# Patient Record
Sex: MALE | Race: WHITE | NOT HISPANIC OR LATINO | Employment: FULL TIME | URBAN - NONMETROPOLITAN AREA
[De-identification: names, ages, dates, MRNs, and addresses within clinical notes are randomized per-mention and may not be internally consistent; named-entity substitution may affect disease eponyms.]

---

## 2022-03-29 ENCOUNTER — OFFICE VISIT (OUTPATIENT)
Dept: URGENT CARE | Facility: CLINIC | Age: 27
End: 2022-03-29
Payer: COMMERCIAL

## 2022-03-29 VITALS
RESPIRATION RATE: 20 BRPM | HEART RATE: 90 BPM | TEMPERATURE: 98.7 F | DIASTOLIC BLOOD PRESSURE: 70 MMHG | OXYGEN SATURATION: 96 % | SYSTOLIC BLOOD PRESSURE: 133 MMHG

## 2022-03-29 DIAGNOSIS — J11.1 FLU: Primary | ICD-10-CM

## 2022-03-29 PROCEDURE — 99203 OFFICE O/P NEW LOW 30 MIN: CPT

## 2022-03-29 RX ORDER — FLUTICASONE PROPIONATE 50 MCG
1 SPRAY, SUSPENSION (ML) NASAL 2 TIMES DAILY
Qty: 9.9 ML | Refills: 0 | Status: SHIPPED | OUTPATIENT
Start: 2022-03-29 | End: 2022-08-05

## 2022-03-29 NOTE — PROGRESS NOTES
[facility]        NAME: Js Morris is a 27 y o  male  : 1995    MRN: [id number]  DATE: 2022  TIME: 2:05 PM    Assessment and Plan   Flu [J11 1]  1  Flu  fluticasone (FLONASE) 50 mcg/act nasal spray     Symptoms most consistent with flu  Will treat as such  Offered to test patient for covid/flu, pt declines at this time  Patient Instructions   Use flonase twice a day one spray in each nostril  Continue taking cold/flu medication  May take tylenol and ibuprofen  Caution using tylenol with anything that contains acetaminophen  Practice good hygiene  Influenza Home Care Guidelines    Your healthcare provider and/or public health staff have evaluated you and have determined that you do not need to remain in the hospital at this time  At this time you can be isolated at home where you will be monitored by staff from your local or state health department  You should carefully follow the prevention and isolation steps below until a healthcare provider or local or state health department says that you can return to your normal activities  Stay home except to get medical care    People who are mildly ill with influenza are able to isolate at home during their illness  You should restrict activities outside your home, except for getting medical care  Do not go to work, school, or public areas  Avoid using public transportation, ride-sharing, or taxis  Separate yourself from other people in your home    People: As much as possible, you should stay in a specific room and away from other people in your home  Also, you should use a separate bathroom, if available  Call ahead before visiting your doctor    If you have a medical appointment, call the healthcare provider and tell them that you have or may have influenza  This will help the healthcare providers office take steps to keep other people from getting infected or exposed      Wear a facemask    You should wear a facemask when you are around other people (e g , sharing a room or vehicle) or pets and before you enter a healthcare providers office  If you are not able to wear a facemask (for example, because it causes trouble breathing), then people who live with you should not stay in the same room with you, or they should wear a facemask if they enter your room  Cover your coughs and sneezes    Cover your mouth and nose with a tissue when you cough or sneeze  Throw used tissues in a lined trash can  Immediately wash your hands with soap and water for at least 20 seconds or, if soap and water are not available, clean your hands with an alcohol-based hand  that contains at least 60% alcohol  Clean your hands often    Wash your hands often with soap and water for at least 20 seconds, especially after blowing your nose, coughing, or sneezing; going to the bathroom; and before eating or preparing food  If soap and water are not readily available, use an alcohol-based hand  with at least 60% alcohol, covering all surfaces of your hands and rubbing them together until they feel dry  Soap and water are the best option if hands are visibly dirty  Avoid touching your eyes, nose, and mouth with unwashed hands  Avoid sharing personal household items    You should not share dishes, drinking glasses, cups, eating utensils, towels, or bedding with other people or pets in your home  After using these items, they should be washed thoroughly with soap and water  Clean all high-touch surfaces everyday    High touch surfaces include counters, tabletops, doorknobs, bathroom fixtures, toilets, phones, keyboards, tablets, and bedside tables  Also, clean any surfaces that may have blood, stool, or body fluids on them  Use a household cleaning spray or wipe, according to the label instructions   Labels contain instructions for safe and effective use of the cleaning product including precautions you should take when applying the product, such as wearing gloves and making sure you have good ventilation during use of the product  Monitor your symptoms    Seek prompt medical attention if your illness is worsening (e g , difficulty breathing)  Before seeking care, call your healthcare provider and tell them that you have, or are being evaluated for, influenza  Put on a facemask before you enter the facility  These steps will help the healthcare providers office to keep other people in the office or waiting room from getting infected or exposed  Ask your healthcare provider to call the local or [facility] health department  Persons who are placed under active monitoring or facilitated self-monitoring should follow instructions provided by their local health department or occupational health professionals, as appropriate  If you have a medical emergency and need to call 911, notify the dispatch personnel that you have, or are being evaluated for influenza  If possible, put on a facemask before emergency medical services arrive  Discontinuing home isolation    Patients with confirmed influenza should remain under home isolation precautions until the following conditions are met:   - They have had no fever for at least 24 hours (that is one full day of no fever without the use medicine that reduces fevers i e: acetaminophen (Tylenol) and ibuprofen (motrin) )  AND  - other symptoms have improved (for example, when their cough or shortness of breath have improved)        Follow up with PCP in 3-5 days  Proceed to  ER if symptoms worsen  Chief Complaint     Chief Complaint   Patient presents with    Cold Like Symptoms     fever, chills, cough, headache, body aches and congestion  Tylenol and cough meds and Thera Flu         History of Present Illness       Pt states that yesterday morning he started with fever, chills, cough, body aches, rhinorrhea, congestion, sneezing  Tmax 103  Is taking theraflu and tylenol with relief   Is pushing fluids including pedialyte and gatorade  Feels fatigued  States girlfriend was recently sick at home with similar symptoms  Son was also sick with similar symptoms Had to call off work today  Denies n/v/d  Is not vaccinated for flu  Took at home covid test which was negative  Review of Systems   Review of Systems   Constitutional: Positive for activity change, appetite change, chills, fatigue and fever  HENT: Positive for congestion, postnasal drip, rhinorrhea and sneezing  Negative for ear pain, facial swelling, nosebleeds, sinus pressure, sinus pain, sore throat and trouble swallowing  Respiratory: Positive for cough  Negative for shortness of breath and wheezing  Cardiovascular: Negative for chest pain and palpitations  Gastrointestinal: Negative for diarrhea, nausea and vomiting  Musculoskeletal: Positive for myalgias  Neurological: Negative for headaches  All other systems reviewed and are negative  Current Medications       Current Outpatient Medications:     fluticasone (FLONASE) 50 mcg/act nasal spray, 1 spray into each nostril 2 (two) times a day for 7 days, Disp: 9 9 mL, Rfl: 0    Current Allergies     Allergies as of 03/29/2022    (No Known Allergies)            The following portions of the patient's history were reviewed and updated as appropriate: allergies, current medications, past family history, past medical history, past social history, past surgical history and problem list      History reviewed  No pertinent past medical history  History reviewed  No pertinent surgical history  History reviewed  No pertinent family history  Medications have been verified  Objective   /70   Pulse 90   Temp 98 7 °F (37 1 °C)   Resp 20   SpO2 96%        Physical Exam     Physical Exam  Vitals reviewed  Constitutional:       General: He is not in acute distress  Appearance: Normal appearance  He is normal weight   He is not ill-appearing or toxic-appearing  HENT:      Right Ear: A middle ear effusion is present  Tympanic membrane is not injected, erythematous or bulging  Left Ear: A middle ear effusion is present  Tympanic membrane is not injected, erythematous or bulging  Nose: Congestion present  Right Turbinates: Enlarged  Not swollen or pale  Left Turbinates: Enlarged  Not swollen or pale  Right Sinus: No maxillary sinus tenderness or frontal sinus tenderness  Left Sinus: No maxillary sinus tenderness or frontal sinus tenderness  Mouth/Throat:      Pharynx: Uvula midline  Posterior oropharyngeal erythema present  No pharyngeal swelling, oropharyngeal exudate or uvula swelling  Comments: Post nasal drip noted  Cardiovascular:      Rate and Rhythm: Normal rate and regular rhythm  Pulses: Normal pulses  Heart sounds: Normal heart sounds  No murmur heard  No friction rub  No gallop  Pulmonary:      Effort: Pulmonary effort is normal  No respiratory distress  Breath sounds: Normal breath sounds  No stridor  No wheezing, rhonchi or rales  Chest:      Chest wall: No tenderness  Lymphadenopathy:      Cervical: No cervical adenopathy  Skin:     General: Skin is warm  Capillary Refill: Capillary refill takes less than 2 seconds  Neurological:      General: No focal deficit present  Mental Status: He is alert

## 2022-03-29 NOTE — PATIENT INSTRUCTIONS
Use flonase twice a day one spray in each nostril  Continue taking cold/flu medication  May take tylenol and ibuprofen  Caution using tylenol with anything that contains acetaminophen  Practice good hygiene  Influenza Home Care Guidelines    Your healthcare provider and/or public health staff have evaluated you and have determined that you do not need to remain in the hospital at this time  At this time you can be isolated at home where you will be monitored by staff from your local or state health department  You should carefully follow the prevention and isolation steps below until a healthcare provider or local or state health department says that you can return to your normal activities  Stay home except to get medical care    People who are mildly ill with influenza are able to isolate at home during their illness  You should restrict activities outside your home, except for getting medical care  Do not go to work, school, or public areas  Avoid using public transportation, ride-sharing, or taxis  Separate yourself from other people in your home    People: As much as possible, you should stay in a specific room and away from other people in your home  Also, you should use a separate bathroom, if available  Call ahead before visiting your doctor    If you have a medical appointment, call the healthcare provider and tell them that you have or may have influenza  This will help the healthcare providers office take steps to keep other people from getting infected or exposed  Wear a facemask    You should wear a facemask when you are around other people (e g , sharing a room or vehicle) or pets and before you enter a healthcare providers office  If you are not able to wear a facemask (for example, because it causes trouble breathing), then people who live with you should not stay in the same room with you, or they should wear a facemask if they enter your room      Cover your coughs and sneezes    Cover your mouth and nose with a tissue when you cough or sneeze  Throw used tissues in a lined trash can  Immediately wash your hands with soap and water for at least 20 seconds or, if soap and water are not available, clean your hands with an alcohol-based hand  that contains at least 60% alcohol  Clean your hands often    Wash your hands often with soap and water for at least 20 seconds, especially after blowing your nose, coughing, or sneezing; going to the bathroom; and before eating or preparing food  If soap and water are not readily available, use an alcohol-based hand  with at least 60% alcohol, covering all surfaces of your hands and rubbing them together until they feel dry  Soap and water are the best option if hands are visibly dirty  Avoid touching your eyes, nose, and mouth with unwashed hands  Avoid sharing personal household items    You should not share dishes, drinking glasses, cups, eating utensils, towels, or bedding with other people or pets in your home  After using these items, they should be washed thoroughly with soap and water  Clean all high-touch surfaces everyday    High touch surfaces include counters, tabletops, doorknobs, bathroom fixtures, toilets, phones, keyboards, tablets, and bedside tables  Also, clean any surfaces that may have blood, stool, or body fluids on them  Use a household cleaning spray or wipe, according to the label instructions  Labels contain instructions for safe and effective use of the cleaning product including precautions you should take when applying the product, such as wearing gloves and making sure you have good ventilation during use of the product  Monitor your symptoms    Seek prompt medical attention if your illness is worsening (e g , difficulty breathing)  Before seeking care, call your healthcare provider and tell them that you have, or are being evaluated for, influenza   Put on a facemask before you enter the facility  These steps will help the healthcare providers office to keep other people in the office or waiting room from getting infected or exposed  Ask your healthcare provider to call the local or state health department  Persons who are placed under active monitoring or facilitated self-monitoring should follow instructions provided by their local health department or occupational health professionals, as appropriate  If you have a medical emergency and need to call 911, notify the dispatch personnel that you have, or are being evaluated for influenza  If possible, put on a facemask before emergency medical services arrive      Discontinuing home isolation    Patients with confirmed influenza should remain under home isolation precautions until the following conditions are met:   - They have had no fever for at least 24 hours (that is one full day of no fever without the use medicine that reduces fevers i e: acetaminophen (Tylenol) and ibuprofen (motrin) )  AND  - other symptoms have improved (for example, when their cough or shortness of breath have improved)

## 2022-03-29 NOTE — LETTER
[facility] [address] [address]  Dept: [phone]    March 29, 2022    Patient: Venkat Warren  YOB: 1995    Venkat Warren was seen and evaluated at our [facility]  Flu is positive, they may return to work on 4/4/2022, as this is 5 days from the onset of symptoms  Upon return, they must then adhere to strict masking for an additional 5 days      Sincerely,    [employer] [facility]

## 2022-08-05 ENCOUNTER — OFFICE VISIT (OUTPATIENT)
Dept: URGENT CARE | Facility: CLINIC | Age: 27
End: 2022-08-05
Payer: COMMERCIAL

## 2022-08-05 VITALS
SYSTOLIC BLOOD PRESSURE: 130 MMHG | HEART RATE: 82 BPM | RESPIRATION RATE: 20 BRPM | OXYGEN SATURATION: 96 % | TEMPERATURE: 98.1 F | DIASTOLIC BLOOD PRESSURE: 68 MMHG

## 2022-08-05 DIAGNOSIS — U07.1 COVID-19: Primary | ICD-10-CM

## 2022-08-05 PROCEDURE — 99213 OFFICE O/P EST LOW 20 MIN: CPT

## 2022-08-05 NOTE — LETTER
[facility] [address] [address]  Dept: [phone]    August 5, 2022    Patient: Maciel Lowe  YOB: 1995    Maciel Lowe was seen and evaluated at our [facility]  Please note if Covid and Flu tests are negative, they may return to work when fever free for 24 hours without the use of a fever reducing agent  If Covid or Flu test is positive, they may return to work on 8/8/2022, as this is 5 days from the onset of symptoms  Upon return, they must then adhere to strict masking for an additional 5 days      Sincerely,    [employer] [facility]

## 2022-08-05 NOTE — PROGRESS NOTES
[employer]        NAME: Rafat Garnica is a 27 y o  male  : 1995    MRN: [id number]  DATE: 2022  TIME: 12:48 PM    Assessment and Plan   COVID-19 [U07 1]  1  COVID-19  Ambulatory referral to [facility]     covid-19  Needed work note  Needs PCP, referral placed    Patient Instructions     You have tested positive for COVID-19  Current recommendations include 2000 International Units of vitamin D3 by mouth daily, 1 g of vitamin-C twice a day, and a multivitamin  Quarantine guidelines state that you must quarantine for 5 days from the onset of symptoms with positive test   On day 6 you may return to work as long as you wear a mask for the next 5 days and take breaks alone with a closed door  This quarantine guideline is only if your symptoms are getting better and you have no fever for 24 hours on day 5 without the use of a fever reducing agent such as Tylenol or Motrin  If your symptoms are not getting better or your fever is persistent beyond those days it is important to continue quarantine  Individuals should be fever-free for 24 hours without medication and improving before ending isolation   Highly immunosuppressed patients should remain on isolation for ? 10 days due to risk of prolonged viral shedding     Contact your family doctor in regards to your positive result for possible treatment options  If your symptoms worsen including shortness of breath proceed to emergency department, please call the emergency department prior to arrival and inform them that you are COVID positive  [address]    Your healthcare provider and/or public health staff have evaluated you and have determined that you do not need to remain in the hospital at this time  At this time you can be isolated at home where you will be monitored by staff from your local or state health department   You should carefully follow the prevention and isolation steps below until a healthcare provider or local or formerly [facility] health department says that you can return to your normal activities  Stay home except to get medical care    People who are mildly ill with COVID-19 are able to isolate at home during their illness  You should restrict activities outside your home, except for getting medical care  Do not go to work, school, or public areas  Avoid using public transportation, ride-sharing, or taxis  Separate yourself from other people and animals in your home    People: As much as possible, you should stay in a specific room and away from other people in your home  Also, you should use a separate bathroom, if available  Animals: You should restrict contact with pets and other animals while you are sick with COVID-19, just like you would around other people  Although there have not been reports of pets or other animals becoming sick with COVID-19, it is still recommended that people sick with COVID-19 limit contact with animals until more information is known about the virus  When possible, have another member of your household care for your animals while you are sick  If you are sick with COVID-19, avoid contact with your pet, including petting, snuggling, being kissed or licked, and sharing food  If you must care for your pet or be around animals while you are sick, wash your hands before and after you interact with pets and wear a facemask  See COVID-19 and Animals for more information  Call ahead before visiting your doctor    If you have a medical appointment, call the healthcare provider and tell them that you have or may have COVID-19  This will help the healthcare providers office take steps to keep other people from getting infected or exposed  Wear a facemask    You should wear a facemask when you are around other people (e g , sharing a room or vehicle) or pets and before you enter a healthcare providers office   If you are not able to wear a facemask (for example, because it causes trouble breathing), then people who live with you should not stay in the same room with you, or they should wear a facemask if they enter your room  Cover your coughs and sneezes    Cover your mouth and nose with a tissue when you cough or sneeze  Throw used tissues in a lined trash can  Immediately wash your hands with soap and water for at least 20 seconds or, if soap and water are not available, clean your hands with an alcohol-based hand  that contains at least 60% alcohol  Clean your hands often    Wash your hands often with soap and water for at least 20 seconds, especially after blowing your nose, coughing, or sneezing; going to the bathroom; and before eating or preparing food  If soap and water are not readily available, use an alcohol-based hand  with at least 60% alcohol, covering all surfaces of your hands and rubbing them together until they feel dry  Soap and water are the best option if hands are visibly dirty  Avoid touching your eyes, nose, and mouth with unwashed hands  Avoid sharing personal household items    You should not share dishes, drinking glasses, cups, eating utensils, towels, or bedding with other people or pets in your home  After using these items, they should be washed thoroughly with soap and water  Clean all high-touch surfaces everyday    High touch surfaces include counters, tabletops, doorknobs, bathroom fixtures, toilets, phones, keyboards, tablets, and bedside tables  Also, clean any surfaces that may have blood, stool, or body fluids on them  Use a household cleaning spray or wipe, according to the label instructions  Labels contain instructions for safe and effective use of the cleaning product including precautions you should take when applying the product, such as wearing gloves and making sure you have good ventilation during use of the product      Monitor your symptoms    Seek prompt medical attention if your illness is worsening (e g , difficulty breathing)  Before seeking care, call your healthcare provider and tell them that you have, or are being evaluated for, COVID-19  Put on a facemask before you enter the facility  These steps will help the healthcare providers office to keep other people in the office or waiting room from getting infected or exposed  Ask your healthcare provider to call the local or [facility] health department  Persons who are placed under active monitoring or facilitated self-monitoring should follow instructions provided by their local health department or occupational health professionals, as appropriate  If you have a medical emergency and need to call 911, notify the dispatch personnel that you have, or are being evaluated for COVID-19  If possible, put on a facemask before emergency medical services arrive  Discontinuing home isolation    Patients with confirmed COVID-19 should remain under home isolation precautions until the following conditions are met:   - They have had no fever for at least 24 hours (that is one full day of no fever without the use medicine that reduces fevers)  AND  - other symptoms have improved (for example, when their cough or shortness of breath have improved)  AND  - If had mild or moderate illness, at least 10 days have passed since their symptoms first appeared or if severe illness (needed oxygen) or immunosuppressed, at least 20 days have passed since symptoms first appeared  Patients with confirmed COVID-19 should also notify close contacts (including their workplace) and ask that they self-quarantine  Currently, close contact is defined as being within 6 feet for 15 minutes or more from the period 24 hours starting 48 hours before symptom onset to the time at which the patient went into isolation    Close contacts of patients diagnosed with COVID-19 should be instructed by the patient to self-quarantine for 14 days from the last time of their last contact with the patient  [facility]   Follow up with PCP in 3-5 days  Proceed to  ER if symptoms worsen  Chief Complaint     Chief Complaint   Patient presents with   Rachel Shelleyl +     + since Tuesday  Fevers, cough, congestion, chills  Using Tylenol and Motrin         History of Present Illness       Patient is a 27year old male who presents to the office for + COVID  States he has had on and off fevers  Has been using tylenol and motrin at home  Needs a note for work  Denies needing other medication  Is not vaccinated  Has not had COVID in past  Girlfriend and son have COVID at home  Has fever, chills, cough, rhinorrhea, myalgia, sore throat, sneezing, fatigue  Denies chest pain or sob  Review of Systems   Review of Systems   Constitutional: Positive for chills, fatigue and fever  HENT: Positive for congestion, postnasal drip, sinus pressure, sinus pain, sneezing and sore throat  Negative for trouble swallowing  Respiratory: Positive for cough  Negative for shortness of breath and wheezing  Cardiovascular: Negative for chest pain and palpitations  Gastrointestinal: Negative for diarrhea, nausea and vomiting  Musculoskeletal: Positive for myalgias  Neurological: Negative for headaches  All other systems reviewed and are negative  Current Medications     No current outpatient medications on file  Current Allergies     Allergies as of 08/05/2022    (No Known Allergies)            The following portions of the patient's history were reviewed and updated as appropriate: allergies, current medications, past family history, past medical history, past social history, past surgical history and problem list      History reviewed  No pertinent past medical history  History reviewed  No pertinent surgical history  History reviewed  No pertinent family history  Medications have been verified          Objective   /68 Pulse 82   Temp 98 1 °F (36 7 °C)   Resp 20   SpO2 96%        Physical Exam     Physical Exam  Vitals and nursing note reviewed  Constitutional:       General: He is not in acute distress  Appearance: Normal appearance  He is normal weight  He is not ill-appearing or toxic-appearing  HENT:      Right Ear: There is impacted cerumen  Left Ear: There is impacted cerumen  Nose: Congestion and rhinorrhea present  Rhinorrhea is clear  Right Turbinates: Enlarged and swollen  Not pale  Left Turbinates: Enlarged and swollen  Not pale  Right Sinus: No maxillary sinus tenderness or frontal sinus tenderness  Left Sinus: No maxillary sinus tenderness or frontal sinus tenderness  Mouth/Throat:      Pharynx: Uvula midline  Posterior oropharyngeal erythema present  No pharyngeal swelling, oropharyngeal exudate or uvula swelling  Tonsils: No tonsillar exudate or tonsillar abscesses  Comments: Posterior pharynx erythema  Cardiovascular:      Rate and Rhythm: Normal rate and regular rhythm  Pulses: Normal pulses  Heart sounds: Normal heart sounds  No murmur heard  No friction rub  No gallop  Pulmonary:      Effort: Pulmonary effort is normal  No respiratory distress  Breath sounds: Normal breath sounds  No stridor  No wheezing, rhonchi or rales  Chest:      Chest wall: No tenderness  Musculoskeletal:      Cervical back: No tenderness  Lymphadenopathy:      Cervical: No cervical adenopathy  Neurological:      Mental Status: He is alert

## 2022-08-05 NOTE — PATIENT INSTRUCTIONS
You have tested positive for COVID-19  Current recommendations include 2000 International Units of vitamin D3 by mouth daily, 1 g of vitamin-C twice a day, and a multivitamin  Quarantine guidelines state that you must quarantine for 5 days from the onset of symptoms with positive test   On day 6 you may return to work as long as you wear a mask for the next 5 days and take breaks alone with a closed door  This quarantine guideline is only if your symptoms are getting better and you have no fever for 24 hours on day 5 without the use of a fever reducing agent such as Tylenol or Motrin  If your symptoms are not getting better or your fever is persistent beyond those days it is important to continue quarantine  Individuals should be fever-free for 24 hours without medication and improving before ending isolation   Highly immunosuppressed patients should remain on isolation for ? 10 days due to risk of prolonged viral shedding     Contact your family doctor in regards to your positive result for possible treatment options  If your symptoms worsen including shortness of breath proceed to emergency department, please call the emergency department prior to arrival and inform them that you are COVID positive  [address]    Your healthcare provider and/or public health staff have evaluated you and have determined that you do not need to remain in the hospital at this time  At this time you can be isolated at home where you will be monitored by staff from your local or state health department  You should carefully follow the prevention and isolation steps below until a healthcare provider or local or state health department says that you can return to your normal activities  Stay home except to get medical care    People who are mildly ill with COVID-19 are able to isolate at home during their illness  You should restrict activities outside your home, except for getting medical care  Do not go to work, school, or public areas  Avoid using public transportation, ride-sharing, or taxis  Separate yourself from other people and animals in your home    People: As much as possible, you should stay in a specific room and away from other people in your home  Also, you should use a separate bathroom, if available  Animals: You should restrict contact with pets and other animals while you are sick with COVID-19, just like you would around other people  Although there have not been reports of pets or other animals becoming sick with COVID-19, it is still recommended that people sick with COVID-19 limit contact with animals until more information is known about the virus  When possible, have another member of your household care for your animals while you are sick  If you are sick with COVID-19, avoid contact with your pet, including petting, snuggling, being kissed or licked, and sharing food  If you must care for your pet or be around animals while you are sick, wash your hands before and after you interact with pets and wear a facemask  See COVID-19 and Animals for more information  Call ahead before visiting your doctor    If you have a medical appointment, call the healthcare provider and tell them that you have or may have COVID-19  This will help the healthcare providers office take steps to keep other people from getting infected or exposed  Wear a facemask    You should wear a facemask when you are around other people (e g , sharing a room or vehicle) or pets and before you enter a healthcare providers office  If you are not able to wear a facemask (for example, because it causes trouble breathing), then people who live with you should not stay in the same room with you, or they should wear a facemask if they enter your room  Cover your coughs and sneezes    Cover your mouth and nose with a tissue when you cough or sneeze  Throw used tissues in a lined trash can   Immediately wash your hands with soap and water for at least 20 seconds or, if soap and water are not available, clean your hands with an alcohol-based hand  that contains at least 60% alcohol  Clean your hands often    Wash your hands often with soap and water for at least 20 seconds, especially after blowing your nose, coughing, or sneezing; going to the bathroom; and before eating or preparing food  If soap and water are not readily available, use an alcohol-based hand  with at least 60% alcohol, covering all surfaces of your hands and rubbing them together until they feel dry  Soap and water are the best option if hands are visibly dirty  Avoid touching your eyes, nose, and mouth with unwashed hands  Avoid sharing personal household items    You should not share dishes, drinking glasses, cups, eating utensils, towels, or bedding with other people or pets in your home  After using these items, they should be washed thoroughly with soap and water  Clean all high-touch surfaces everyday    High touch surfaces include counters, tabletops, doorknobs, bathroom fixtures, toilets, phones, keyboards, tablets, and bedside tables  Also, clean any surfaces that may have blood, stool, or body fluids on them  Use a household cleaning spray or wipe, according to the label instructions  Labels contain instructions for safe and effective use of the cleaning product including precautions you should take when applying the product, such as wearing gloves and making sure you have good ventilation during use of the product  Monitor your symptoms    Seek prompt medical attention if your illness is worsening (e g , difficulty breathing)  Before seeking care, call your healthcare provider and tell them that you have, or are being evaluated for, COVID-19  Put on a facemask before you enter the facility   These steps will help the healthcare providers office to keep other people in the office or waiting room from getting infected or exposed  Ask your healthcare provider to call the local or state health department  Persons who are placed under active monitoring or facilitated self-monitoring should follow instructions provided by their local health department or occupational health professionals, as appropriate  If you have a medical emergency and need to call 911, notify the dispatch personnel that you have, or are being evaluated for COVID-19  If possible, put on a facemask before emergency medical services arrive  Discontinuing home isolation    Patients with confirmed COVID-19 should remain under home isolation precautions until the following conditions are met: They have had no fever for at least 24 hours (that is one full day of no fever without the use medicine that reduces fevers)  AND  other symptoms have improved (for example, when their cough or shortness of breath have improved)  AND  If had mild or moderate illness, at least 10 days have passed since their symptoms first appeared or if severe illness (needed oxygen) or immunosuppressed, at least 20 days have passed since symptoms first appeared  Patients with confirmed COVID-19 should also notify close contacts (including their workplace) and ask that they self-quarantine  Currently, close contact is defined as being within 6 feet for 15 minutes or more from the period 24 hours starting 48 hours before symptom onset to the time at which the patient went into isolation  Close contacts of patients diagnosed with COVID-19 should be instructed by the patient to self-quarantine for 14 days from the last time of their last contact with the patient       Source: [employer]

## 2022-12-19 ENCOUNTER — HOSPITAL ENCOUNTER (EMERGENCY)
Facility: HOSPITAL | Age: 27
Discharge: HOME | End: 2022-12-19
Attending: EMERGENCY MEDICINE

## 2022-12-19 VITALS
DIASTOLIC BLOOD PRESSURE: 64 MMHG | TEMPERATURE: 97.5 F | BODY MASS INDEX: 17.18 KG/M2 | HEIGHT: 69 IN | WEIGHT: 115.96 LBS | OXYGEN SATURATION: 98 % | RESPIRATION RATE: 16 BRPM | HEART RATE: 88 BPM | SYSTOLIC BLOOD PRESSURE: 121 MMHG

## 2022-12-19 DIAGNOSIS — T78.40XA ALLERGIC REACTION, INITIAL ENCOUNTER: Primary | ICD-10-CM

## 2022-12-19 RX ORDER — METHYLPREDNISOLONE SODIUM SUCCINATE 125 MG/2ML
125 INJECTION, POWDER, LYOPHILIZED, FOR SOLUTION INTRAMUSCULAR; INTRAVENOUS ONCE
Status: COMPLETED | OUTPATIENT
Start: 2022-12-19 | End: 2022-12-19

## 2022-12-19 RX ORDER — PREDNISONE 20 MG/1
20 TABLET ORAL DAILY
Qty: 15 TABLET | Refills: 0 | Status: SHIPPED | OUTPATIENT
Start: 2022-12-19

## 2022-12-19 RX ADMIN — METHYLPREDNISOLONE SODIUM SUCCINATE 125 MG: 125 INJECTION, POWDER, FOR SOLUTION INTRAMUSCULAR; INTRAVENOUS at 07:54

## 2022-12-19 NOTE — ED PROVIDER NOTES
History  Chief Complaint   Patient presents with   • Allergic Reaction     Patient presents to the ER for a possible allergic reaction  He woke up at 0300 due to his face feeling swollen, a "lump" in his throat, and hives on his neck  He states it feels "weird" swallowing  No resp distress  Patient had epi and benadryl pre hospital       Patient is a pleasant 59-year-old male presenting after he woke with an allergic reaction  He states he woke up approximate 3:00 in the morning and felt a lump in his throat  He then noted hives on upper back and neck area  He then felt his lips mostly his upper lip felt swollen  So little from a sensation in the back of his throat  Denies any wheezing or difficulty breathing  No difficulty swallowing  No known food exposures or medications, lotions or detergents  No recent travel  No other family members have similar symptoms  States he had tacos which she made at home last evening which she has had before  No different ingredients or condiments  Patient arrived via EMS and received Benadryl IV and epinephrine IM by EMS  None       History reviewed  No pertinent past medical history  History reviewed  No pertinent surgical history  History reviewed  No pertinent family history  I have reviewed and agree with the history as documented  E-Cigarette/Vaping   • E-Cigarette Use Never User      E-Cigarette/Vaping Substances     Social History     Tobacco Use   • Smoking status: Never   • Smokeless tobacco: Never   Vaping Use   • Vaping Use: Never used   Substance Use Topics   • Alcohol use: Never   • Drug use: Never       Review of Systems   Constitutional: Negative for activity change, appetite change, chills and fever  HENT: Negative for ear pain, hearing loss, rhinorrhea, sneezing, sore throat and trouble swallowing  Eyes: Negative for pain and visual disturbance     Respiratory: Negative for cough, choking, chest tightness, shortness of breath, wheezing and stridor  Cardiovascular: Negative for chest pain, palpitations and leg swelling  Gastrointestinal: Negative for abdominal pain, constipation, diarrhea, nausea and vomiting  Genitourinary: Negative for difficulty urinating, dysuria, frequency, hematuria and testicular pain  Musculoskeletal: Negative for arthralgias, back pain, gait problem and neck pain  Skin: Positive for pallor  Negative for color change and rash  Allergic/Immunologic: Negative for immunocompromised state  Neurological: Negative for dizziness, seizures, syncope, speech difficulty, weakness, light-headedness, numbness and headaches  Psychiatric/Behavioral: Negative for confusion  The patient is not nervous/anxious  All other systems reviewed and are negative  Physical Exam  Physical Exam  Vitals and nursing note reviewed  Constitutional:       General: He is not in acute distress  Appearance: Normal appearance  He is well-developed and normal weight  He is not ill-appearing, toxic-appearing or diaphoretic  HENT:      Head: Normocephalic and atraumatic  Comments: Mild swelling of the upper leg     Nose: Nose normal  No congestion  Mouth/Throat:      Mouth: Mucous membranes are moist       Pharynx: Oropharynx is clear  Eyes:      General: No scleral icterus  Extraocular Movements: Extraocular movements intact  Conjunctiva/sclera: Conjunctivae normal    Cardiovascular:      Rate and Rhythm: Normal rate and regular rhythm  Pulses: Normal pulses  Heart sounds: Normal heart sounds  No murmur heard  Pulmonary:      Effort: Pulmonary effort is normal  No respiratory distress  Breath sounds: Normal breath sounds  No wheezing or rales  Chest:      Chest wall: No tenderness  Abdominal:      General: Bowel sounds are normal  There is no distension  Palpations: Abdomen is soft  There is no mass  Tenderness: There is no abdominal tenderness   There is no right CVA tenderness, left CVA tenderness, guarding or rebound  Musculoskeletal:         General: No swelling, tenderness or deformity  Normal range of motion  Cervical back: Normal range of motion and neck supple  Lymphadenopathy:      Cervical: No cervical adenopathy  Skin:     General: Skin is warm and dry  Capillary Refill: Capillary refill takes less than 2 seconds  Coloration: Skin is not jaundiced  Findings: Rash present  No erythema  Comments: Small area of urticaria posterior neck   Neurological:      General: No focal deficit present  Mental Status: He is alert and oriented to person, place, and time  Motor: No abnormal muscle tone  Psychiatric:         Mood and Affect: Mood normal          Behavior: Behavior normal          Vital Signs  ED Triage Vitals [12/19/22 0717]   Temperature Pulse Respirations Blood Pressure SpO2   97 5 °F (36 4 °C) 89 16 (!) 174/94 99 %      Temp Source Heart Rate Source Patient Position - Orthostatic VS BP Location FiO2 (%)   Temporal Monitor Sitting Left arm --      Pain Score       No Pain           Vitals:    12/19/22 0717 12/19/22 0800 12/19/22 0935   BP: (!) 174/94 117/66 121/64   Pulse: 89 87 88   Patient Position - Orthostatic VS: Sitting Lying Lying         Visual Acuity      ED Medications  Medications   methylPREDNISolone sodium succinate (Solu-MEDROL) injection 125 mg (125 mg Intravenous Given 12/19/22 0754)       Diagnostic Studies  Results Reviewed     None                 No orders to display              Procedures  Procedures         ED Course                               SBIRT 22yo+    Flowsheet Row Most Recent Value   SBIRT (23 yo +)    In order to provide better care to our patients, we are screening all of our patients for alcohol and drug use  Would it be okay to ask you these screening questions? Yes Filed at: 12/19/2022 0886   Initial Alcohol Screen: US AUDIT-C     1   How often do you have a drink containing alcohol? 0 Filed at: 12/19/2022 0841   2  How many drinks containing alcohol do you have on a typical day you are drinking? 0 Filed at: 12/19/2022 0841   3a  Male UNDER 65: How often do you have five or more drinks on one occasion? 0 Filed at: 12/19/2022 0841   3b  FEMALE Any Age, or MALE 65+: How often do you have 4 or more drinks on one occassion? 0 Filed at: 12/19/2022 0841   Audit-C Score 0 Filed at: 12/19/2022 7159   CHELSIE: How many times in the past year have you    Used an illegal drug or used a prescription medication for non-medical reasons? Never Filed at: 12/19/2022 0841                    MDM  Number of Diagnoses or Management Options  Allergic reaction, initial encounter: new and does not require workup  Diagnosis management comments: Patient improving  No difficulty swallowing or difficulty breathing  No posterior pharyngeal erythema or edema  Disposition  Final diagnoses: Allergic reaction, initial encounter     Time reflects when diagnosis was documented in both MDM as applicable and the Disposition within this note     Time User Action Codes Description Comment    12/19/2022  9:27 AM Jacobo ESPOSITO Add [T78 40XA] Allergic reaction, initial encounter       ED Disposition     ED Disposition   Discharge    Condition   Stable    Date/Time   Mon Dec 19, 2022  9:27 AM    Yoel Torres discharge to home/self care  Follow-up Information    None         Discharge Medication List as of 12/19/2022  9:28 AM      START taking these medications    Details   predniSONE 20 mg tablet Take 1 tablet (20 mg total) by mouth daily, Starting Mon 12/19/2022, Normal             No discharge procedures on file      PDMP Review     None          ED Provider  Electronically Signed by           Josselyn Plummer DO  12/19/22 1022

## 2022-12-19 NOTE — Clinical Note
[facility] was seen and treated in our emergency department on 12/19/2022  Diagnosis:     Juni Puga  may return to work on return date  He may return on this date: 12/20/2022         If you have any questions or concerns, please don't hesitate to call        Tamela Javed, DO    ______________________________           _______________          _______________  Hospital Representative                              Date                                Time

## 2022-12-19 NOTE — DISCHARGE INSTRUCTIONS
Take medications as directed and until completed  Follow-up with your primary care physician next available appointment  Return to the emergency department if condition worsens

## 2022-12-20 ENCOUNTER — OFFICE VISIT (OUTPATIENT)
Dept: URGENT CARE | Facility: CLINIC | Age: 27
End: 2022-12-20

## 2022-12-20 VITALS
RESPIRATION RATE: 20 BRPM | HEIGHT: 69 IN | HEART RATE: 92 BPM | SYSTOLIC BLOOD PRESSURE: 144 MMHG | OXYGEN SATURATION: 98 % | BODY MASS INDEX: 31.1 KG/M2 | DIASTOLIC BLOOD PRESSURE: 77 MMHG | TEMPERATURE: 98.2 F | WEIGHT: 210 LBS

## 2022-12-20 DIAGNOSIS — L50.9 URTICARIA: Primary | ICD-10-CM

## 2022-12-20 RX ORDER — PREDNISONE 20 MG/1
30 TABLET ORAL DAILY
Qty: 6 TABLET | Refills: 0 | Status: SHIPPED | OUTPATIENT
Start: 2022-12-20 | End: 2022-12-24

## 2022-12-20 RX ORDER — PREDNISONE 20 MG/1
30 TABLET ORAL DAILY
Status: SHIPPED | OUTPATIENT
Start: 2022-12-21

## 2022-12-20 RX ORDER — FAMOTIDINE 20 MG/1
20 TABLET, FILM COATED ORAL ONCE
Status: COMPLETED | OUTPATIENT
Start: 2022-12-20 | End: 2022-12-20

## 2022-12-20 RX ORDER — FAMOTIDINE 10 MG
10 TABLET ORAL 2 TIMES DAILY
Qty: 10 TABLET | Refills: 0 | Status: SHIPPED | OUTPATIENT
Start: 2022-12-20 | End: 2022-12-25

## 2022-12-20 RX ORDER — DIPHENHYDRAMINE HCL 25 MG
25 TABLET ORAL EVERY 6 HOURS PRN
Qty: 30 TABLET | Refills: 0 | Status: SHIPPED | OUTPATIENT
Start: 2022-12-20

## 2022-12-20 RX ORDER — DIPHENHYDRAMINE HCL 25 MG
25 CAPSULE ORAL ONCE
Status: COMPLETED | OUTPATIENT
Start: 2022-12-20 | End: 2022-12-20

## 2022-12-20 RX ADMIN — PREDNISONE 30 MG: 20 TABLET ORAL at 16:25

## 2022-12-20 RX ADMIN — Medication 25 MG: at 16:26

## 2022-12-20 RX ADMIN — FAMOTIDINE 20 MG: 20 TABLET, FILM COATED ORAL at 16:26

## 2022-12-20 NOTE — PROGRESS NOTES
[facility]        NAME: Precious Nelson is a 24 y o  male  : 1995    MRN: [id number]  DATE: 2022  TIME: 5:08 PM    Assessment and Plan   Urticaria [L50 9]  1  Urticaria  predniSONE tablet 30 mg    famotidine (PEPCID) tablet 20 mg    diphenhydrAMINE (BENADRYL) capsule 25 mg    predniSONE 20 mg tablet    diphenhydrAMINE (BENADRYL) 25 mg tablet    famotidine (PEPCID) 10 mg tablet        Patient had 20 mg of prednisone prior to arrival will increase by 30 mg here in the office as well as for at home for the next 4 days  Benadryl administered in the office as well as Pepcid  We will send prescription for same as patient states he does not have them at home informed that if they are not covered he may pick them up over-the-counter  ER precautions advised  Prior to leaving the office the patient states that he did feel as though his hives were diminishing and it he was starting to feel better  Patient Instructions   The hives are likely related to something you are coming into contact with in which you are allergic to  Take the prednisone as prescribed, add the additional prednisone that I prescribed to you for the next 4 days starting on 2022  Please take Pepcid twice daily for the next 5 to 7 days and use Benadryl every 6 hours as needed for hives or itching  If your symptoms worsen proceed to the emergency room  Otherwise please follow-up with your primary care provider and possibly an allergist to see what you may be allergic to  Please wash your sheets and make sure that you are washing any clothing prior to wearing it in a familiar laundry detergent  Do not change any dryer sheets or fabric softeners  Please do not change sense of any sprays as you may be allergic if you can think of anything that you have recently changed in the past 3 to 4 days this may be the cause try limiting this from your contact  Follow up with PCP in 3-5 days    Proceed to  ER if symptoms worsen  Chief Complaint     Chief Complaint   Patient presents with   • Urticaria         History of Present Illness       Patient is a 24year old male who presents to the office today for hives  States he awoke yesterday with hives and sore throat with swelling  Had benadryl, epinephrine, and steroid  Awoke this morning and had issues again  Did not was the sheets  Denies any changes to dryer sheets, fabric softner, night gowns  Did  the predisone and took one today  Came in for hives that started around 0900  Review of Systems   Review of Systems   HENT: Positive for facial swelling and voice change  Negative for drooling, sore throat and trouble swallowing  Respiratory: Positive for cough  Negative for shortness of breath and wheezing  Cardiovascular: Negative for chest pain and palpitations  Skin: Positive for rash  All other systems reviewed and are negative          Current Medications       Current Outpatient Medications:   •  diphenhydrAMINE (BENADRYL) 25 mg tablet, Take 1 tablet (25 mg total) by mouth every 6 (six) hours as needed for itching, Disp: 30 tablet, Rfl: 0  •  famotidine (PEPCID) 10 mg tablet, Take 1 tablet (10 mg total) by mouth 2 (two) times a day for 5 days, Disp: 10 tablet, Rfl: 0  •  predniSONE 20 mg tablet, Take 1 tablet (20 mg total) by mouth daily, Disp: 15 tablet, Rfl: 0  •  predniSONE 20 mg tablet, Take 1 5 tablets (30 mg total) by mouth daily for 4 days, Disp: 6 tablet, Rfl: 0    Current Facility-Administered Medications:   •  [START ON 12/21/2022] predniSONE tablet 30 mg, 30 mg, Oral, Daily, Vivian BOY Mcintosh, 30 mg at 12/20/22 1625    Current Allergies     Allergies as of 12/20/2022   • (No Known Allergies)            The following portions of the patient's history were reviewed and updated as appropriate: allergies, current medications, past family history, past medical history, past social history, past surgical history and problem list  History reviewed  No pertinent past medical history  History reviewed  No pertinent surgical history  History reviewed  No pertinent family history  Medications have been verified  Objective   /77   Pulse 92   Temp 98 2 °F (36 8 °C)   Resp 20   Ht 5' 9" (1 753 m)   Wt 95 3 kg (210 lb)   SpO2 98%   BMI 31 01 kg/m²        Physical Exam     Physical Exam  Vitals and nursing note reviewed  Constitutional:       General: He is not in acute distress  Appearance: Normal appearance  He is normal weight  He is not ill-appearing or toxic-appearing  HENT:      Right Ear: Tympanic membrane normal       Left Ear: Tympanic membrane normal       Mouth/Throat:      Mouth: Mucous membranes are moist       Pharynx: No posterior oropharyngeal erythema  Comments: Airway patent no swelling  Neck:      Vascular: No carotid bruit  Comments: Vesicular breath sounds normal  Cardiovascular:      Rate and Rhythm: Normal rate and regular rhythm  Pulses: Normal pulses  Heart sounds: Normal heart sounds  Pulmonary:      Effort: Pulmonary effort is normal       Breath sounds: Normal breath sounds  Lymphadenopathy:      Cervical: No cervical adenopathy  Skin:     Capillary Refill: Capillary refill takes less than 2 seconds  Findings: Rash present  Comments: Urticaria noted on the face and neck  Neurological:      General: No focal deficit present  Mental Status: He is alert and oriented to person, place, and time

## 2022-12-20 NOTE — PATIENT INSTRUCTIONS
The hives are likely related to something you are coming into contact with in which you are allergic to  Take the prednisone as prescribed, add the additional prednisone that I prescribed to you for the next 4 days starting on 12/21/2022  Please take Pepcid twice daily for the next 5 to 7 days and use Benadryl every 6 hours as needed for hives or itching  If your symptoms worsen proceed to the emergency room  Otherwise please follow-up with your primary care provider and possibly an allergist to see what you may be allergic to  Please wash your sheets and make sure that you are washing any clothing prior to wearing it in a familiar laundry detergent  Do not change any dryer sheets or fabric softeners  Please do not change sense of any sprays as you may be allergic if you can think of anything that you have recently changed in the past 3 to 4 days this may be the cause try limiting this from your contact

## 2023-05-16 ENCOUNTER — OFFICE VISIT (OUTPATIENT)
Dept: URGENT CARE | Facility: CLINIC | Age: 28
End: 2023-05-16

## 2023-05-16 VITALS
BODY MASS INDEX: 29.77 KG/M2 | HEART RATE: 88 BPM | OXYGEN SATURATION: 95 % | TEMPERATURE: 100.3 F | SYSTOLIC BLOOD PRESSURE: 117 MMHG | RESPIRATION RATE: 18 BRPM | WEIGHT: 201 LBS | DIASTOLIC BLOOD PRESSURE: 73 MMHG | HEIGHT: 69 IN

## 2023-05-16 DIAGNOSIS — J06.9 ACUTE URI: Primary | ICD-10-CM

## 2023-05-16 DIAGNOSIS — R11.0 NAUSEA: ICD-10-CM

## 2023-05-16 RX ORDER — PREDNISONE 20 MG/1
40 TABLET ORAL DAILY
Qty: 10 TABLET | Refills: 0 | Status: SHIPPED | OUTPATIENT
Start: 2023-05-16 | End: 2023-05-21

## 2023-05-16 RX ORDER — BENZONATATE 200 MG/1
200 CAPSULE ORAL 3 TIMES DAILY PRN
Qty: 20 CAPSULE | Refills: 0 | Status: SHIPPED | OUTPATIENT
Start: 2023-05-16

## 2023-05-16 RX ORDER — ONDANSETRON 4 MG/1
4 TABLET, FILM COATED ORAL EVERY 8 HOURS PRN
Qty: 20 TABLET | Refills: 0 | Status: SHIPPED | OUTPATIENT
Start: 2023-05-16

## 2023-05-16 NOTE — PROGRESS NOTES
"  [facility]        NAME: Vianca Child is a 24 y o  male  : 1995    MRN: [id number]  DATE: May 16, 2023  TIME: 4:05 PM    Assessment and Plan   Acute URI [J06 9]  1  Acute URI  predniSONE 20 mg tablet    benzonatate (TESSALON) 200 MG capsule      2  Nausea  ondansetron (ZOFRAN) 4 mg tablet        Symptoms consistent with viral illness  Patient requesting medicine for nausea we will send same  Tessalon for cough  Prednisone  Patient Instructions     Pt appears to have a viral upper respiratory infection  Antibiotics are contraindicated at this time and would not help you feel better faster  Although the symptoms are troublesome, usually the patient's body is able to recover from a viral infection on an average time of 7-10 days  Fever, if any, typically resolves after 3-5 days  If patient has sore throat, typically this resolves within 3-5 days  Any nasal congestion, runny nose, post nasal drip typically begin to  improve after 7-10 days  Any cough may linger over a couple weeks  Please note that having a cough is not necessarily a bad thing  It often times is part of our body's protective mechanism to help keep our airways clear  Please note that yellow mucous doesn't necessarily mean a \"bacterial\" infection  Yellow mucous doesn't automatically mean that an antibiotic is needed  It is not unusual for mucus to become more discolored in the days after the start of an upper respiratory infection  Often times this is due to mucous that has thickened  with white blood cells that have flooded the mucosa to try and fight the viral infection  Ear Pain may occur when the eustachian tubes become blocked with mucous or swollen due to acute inflammation from illness  May give Ibuprofen or Tylenol as needed for comfort  May also use warm compress against ear for comfort    If ear ache is persisting and not improving over 2-3 days or if there is any gross drainage coming from ear, " please seek further evaluation  Natural remedies to help provide comfort for cough/ cold symptoms include: one teaspoon of honey (only in infants over 1 year of age), increased vitamin C (oranges, dexter, etc ), ginger, and drinking plenty of fluids  Vaporizer by bedside  If you should have prolonged symptoms (Greater than 10 days), worsening symptoms, or any new symptoms please seek further medical attention  If you would be having difficulty breathing, seek further evaluation by calling 911 or proceeding to ER for further evaluation  Follow up with PCP in 3-5 days  Proceed to  ER if symptoms worsen  Chief Complaint     Chief Complaint   Patient presents with   • Fever   • Cough   • Earache   • Chills   • Nasal Congestion         History of Present Illness       Patient is a 24year old male who presents to the office today for fever, chills, cough, congestion, right ear pain, sore throat since Friday  tmax 100 7  states children are in day care  Has been taking tylenol and therflu with minimal relief  Denies wheezing or shortness of breath  Cough is productive for green/yellow sputum  Had covid tests at home which are negative  Also complains of nausea without emesis or diarrhea  Review of Systems   Review of Systems   Constitutional: Positive for chills and fever  HENT: Positive for congestion, ear pain, rhinorrhea and sore throat  Respiratory: Positive for cough  Negative for shortness of breath and wheezing  Cardiovascular: Negative for chest pain and palpitations  Gastrointestinal: Positive for nausea  Negative for diarrhea and vomiting  All other systems reviewed and are negative          Current Medications       Current Outpatient Medications:   •  benzonatate (TESSALON) 200 MG capsule, Take 1 capsule (200 mg total) by mouth 3 (three) times a day as needed for cough, Disp: 20 capsule, Rfl: 0  •  ondansetron (ZOFRAN) 4 mg tablet, Take 1 tablet (4 mg total) by mouth every 8 "(eight) hours as needed for nausea or vomiting, Disp: 20 tablet, Rfl: 0  •  predniSONE 20 mg tablet, Take 2 tablets (40 mg total) by mouth daily for 5 days, Disp: 10 tablet, Rfl: 0  •  diphenhydrAMINE (BENADRYL) 25 mg tablet, Take 1 tablet (25 mg total) by mouth every 6 (six) hours as needed for itching (Patient not taking: Reported on 5/16/2023), Disp: 30 tablet, Rfl: 0  •  famotidine (PEPCID) 10 mg tablet, Take 1 tablet (10 mg total) by mouth 2 (two) times a day for 5 days, Disp: 10 tablet, Rfl: 0  No current facility-administered medications for this visit  Current Allergies     Allergies as of 05/16/2023   • (No Known Allergies)            The following portions of the patient's history were reviewed and updated as appropriate: allergies, current medications, past family history, past medical history, past social history, past surgical history and problem list      History reviewed  No pertinent past medical history  History reviewed  No pertinent surgical history  History reviewed  No pertinent family history  Medications have been verified  Objective   /73   Pulse 88   Temp 100 3 °F (37 9 °C)   Resp 18   Ht 5' 9\" (1 753 m)   Wt 91 2 kg (201 lb)   SpO2 95%   BMI 29 68 kg/m²        Physical Exam     Physical Exam  Vitals and nursing note reviewed  Constitutional:       General: He is not in acute distress  Appearance: Normal appearance  He is normal weight  He is not ill-appearing or toxic-appearing  HENT:      Right Ear: Tympanic membrane normal       Left Ear: Tympanic membrane normal       Nose: Congestion present  Right Turbinates: Enlarged and swollen  Left Turbinates: Enlarged and swollen  Mouth/Throat:      Mouth: Mucous membranes are moist       Pharynx: Posterior oropharyngeal erythema present  Comments: Postnasal drip noted  Cardiovascular:      Rate and Rhythm: Normal rate and regular rhythm  Pulses: Normal pulses        Heart sounds: " Normal heart sounds  Pulmonary:      Effort: Pulmonary effort is normal       Breath sounds: Normal breath sounds  Lymphadenopathy:      Cervical: No cervical adenopathy  Skin:     General: Skin is warm  Capillary Refill: Capillary refill takes less than 2 seconds  Neurological:      Mental Status: He is alert

## 2023-05-16 NOTE — LETTER
May 16, 2023     Patient: Saloni Carrero   YOB: 1995   Date of Visit: 5/16/2023       To Whom it May Concern:    Saloni Carrero was seen in my clinic on 5/16/2023  He may return to work on 5/18/2023 or when symptoms improve       If you have any questions or concerns, please don't hesitate to call           Sincerely,          [facility] [employer]        CC: No Recipients

## 2023-05-16 NOTE — PATIENT INSTRUCTIONS
"Pt appears to have a viral upper respiratory infection  Antibiotics are contraindicated at this time and would not help you feel better faster  Although the symptoms are troublesome, usually the patient's body is able to recover from a viral infection on an average time of 7-10 days  Fever, if any, typically resolves after 3-5 days  If patient has sore throat, typically this resolves within 3-5 days  Any nasal congestion, runny nose, post nasal drip typically begin to  improve after 7-10 days  Any cough may linger over a couple weeks  Please note that having a cough is not necessarily a bad thing  It often times is part of our body's protective mechanism to help keep our airways clear  Please note that yellow mucous doesn't necessarily mean a \"bacterial\" infection  Yellow mucous doesn't automatically mean that an antibiotic is needed  It is not unusual for mucus to become more discolored in the days after the start of an upper respiratory infection  Often times this is due to mucous that has thickened  with white blood cells that have flooded the mucosa to try and fight the viral infection  Ear Pain may occur when the eustachian tubes become blocked with mucous or swollen due to acute inflammation from illness  May give Ibuprofen or Tylenol as needed for comfort  May also use warm compress against ear for comfort  If ear ache is persisting and not improving over 2-3 days or if there is any gross drainage coming from ear, please seek further evaluation  Natural remedies to help provide comfort for cough/ cold symptoms include: one teaspoon of honey (only in infants over 1 year of age), increased vitamin C (oranges, dexter, etc ), ginger, and drinking plenty of fluids  Vaporizer by bedside  If you should have prolonged symptoms (Greater than 10 days), worsening symptoms, or any new symptoms please seek further medical attention      If you would be having difficulty breathing, seek " further evaluation by calling 911 or proceeding to ER for further evaluation

## 2024-08-12 ENCOUNTER — OFFICE VISIT (OUTPATIENT)
Dept: URGENT CARE | Facility: CLINIC | Age: 29
End: 2024-08-12

## 2024-08-12 VITALS
DIASTOLIC BLOOD PRESSURE: 72 MMHG | RESPIRATION RATE: 20 BRPM | OXYGEN SATURATION: 97 % | HEART RATE: 115 BPM | BODY MASS INDEX: 29.42 KG/M2 | TEMPERATURE: 100.9 F | SYSTOLIC BLOOD PRESSURE: 158 MMHG | WEIGHT: 199.2 LBS

## 2024-08-12 DIAGNOSIS — R68.89 FLU-LIKE SYMPTOMS: Primary | ICD-10-CM

## 2024-08-12 PROCEDURE — G0382 LEV 3 HOSP TYPE B ED VISIT: HCPCS

## 2024-08-12 RX ORDER — ONDANSETRON 4 MG/1
4 TABLET, FILM COATED ORAL EVERY 8 HOURS PRN
Qty: 20 TABLET | Refills: 0 | Status: SHIPPED | OUTPATIENT
Start: 2024-08-12

## 2024-08-12 NOTE — LETTER
August 12, 2024     Patient: Isaac Noe   YOB: 1995   Date of Visit: 8/12/2024       To Whom It May Concern:    It is my medical opinion that Isaac Noe may return to work on 8/14/2024 if fever free for 24 hours and symptoms are improving .    If you have any questions or concerns, please don't hesitate to call.         Sincerely,        BOY Mahmood    CC: No Recipients

## 2024-08-12 NOTE — PATIENT INSTRUCTIONS
"Pt appears to have a viral upper respiratory infection. Antibiotics are contraindicated at this time and would not help you feel better faster.     Although the symptoms are troublesome, usually the patient's body is able to recover from a viral infection on an average time of 7-10 days.  Fever, if any, typically resolves after 3-5 days.  If patient has sore throat, typically this resolves within 3-5 days.  Any nasal congestion, runny nose, post nasal drip typically begin to  improve after 7-10 days.  Any cough may linger over a couple weeks.  Please note that having a cough is not necessarily a bad thing.  It often times is part of our body's protective mechanism to help keep our airways clear.      Please note that yellow mucous doesn't necessarily mean a \"bacterial\" infection.  Yellow mucous doesn't automatically mean that an antibiotic is needed.  It is not unusual for mucus to become more discolored in the days after the start of an upper respiratory infection.  Often times this is due to mucous that has thickened  with white blood cells that have flooded the mucosa to try and fight the viral infection.      Ear Pain may occur when the eustachian tubes become blocked with mucous or swollen due to acute inflammation from illness. May give Ibuprofen or Tylenol as needed for comfort.  May also use warm compress against ear for comfort.  If ear ache is persisting and not improving over 2-3 days or if there is any gross drainage coming from ear, please seek further evaluation.      Natural remedies to help provide comfort for cough/ cold symptoms include: one teaspoon of honey (only in infants over 1 year of age), increased vitamin C (oranges, dexter, etc.), ginger, and drinking plenty of fluids. Vaporizer by bedside.    If you should have prolonged symptoms (Greater than 10 days), worsening symptoms, or any new symptoms please seek further medical attention.    If you would be having difficulty breathing, seek " further evaluation by calling 911 or proceeding to ER for further evaluation.

## 2024-08-12 NOTE — PROGRESS NOTES
"  [facility]        NAME: Isaac Noe is a 28 y.o. male  : 1995    MRN: [id number]  DATE: 2024  TIME: 12:09 PM    Assessment and Plan   Flu-like symptoms [R68.89]  1. Flu-like symptoms  ondansetron (ZOFRAN) 4 mg tablet      Zofran for nausea. Symtpoms consistent with influenza  Offered testing for covid/flu. Pt declines at this time. Advised supportive care.      Patient Instructions   Pt appears to have a viral upper respiratory infection. Antibiotics are contraindicated at this time and would not help you feel better faster.     Although the symptoms are troublesome, usually the patient's body is able to recover from a viral infection on an average time of 7-10 days.  Fever, if any, typically resolves after 3-5 days.  If patient has sore throat, typically this resolves within 3-5 days.  Any nasal congestion, runny nose, post nasal drip typically begin to  improve after 7-10 days.  Any cough may linger over a couple weeks.  Please note that having a cough is not necessarily a bad thing.  It often times is part of our body's protective mechanism to help keep our airways clear.      Please note that yellow mucous doesn't necessarily mean a \"bacterial\" infection.  Yellow mucous doesn't automatically mean that an antibiotic is needed.  It is not unusual for mucus to become more discolored in the days after the start of an upper respiratory infection.  Often times this is due to mucous that has thickened  with white blood cells that have flooded the mucosa to try and fight the viral infection.      Ear Pain may occur when the eustachian tubes become blocked with mucous or swollen due to acute inflammation from illness. May give Ibuprofen or Tylenol as needed for comfort.  May also use warm compress against ear for comfort.  If ear ache is persisting and not improving over 2-3 days or if there is any gross drainage coming from ear, please seek further evaluation.      Natural remedies to " help provide comfort for cough/ cold symptoms include: one teaspoon of honey (only in infants over 1 year of age), increased vitamin C (oranges, dexter, etc.), ginger, and drinking plenty of fluids. Vaporizer by bedside.    If you should have prolonged symptoms (Greater than 10 days), worsening symptoms, or any new symptoms please seek further medical attention.    If you would be having difficulty breathing, seek further evaluation by calling 911 or proceeding to ER for further evaluation.     Follow up with PCP in 3-5 days.  Proceed to  ER if symptoms worsen.    Chief Complaint     Chief Complaint   Patient presents with    Cold Like Symptoms     Aches, fevers and nausea since Friday.  Using Tylenol.          History of Present Illness       Patient is a 28 year old male who presents to the office today for a fever, body aches, cough, congestion, rhinorrhea, chills, decreased appetite, nausea, body aches. Did have one episode of vomiting this morning. Denies diarrhea. Started Friday out of nowhere. Symptoms are unchanged.         Review of Systems   Review of Systems   Constitutional:  Positive for activity change, appetite change, chills, fatigue and fever.   HENT:  Positive for congestion and rhinorrhea.    Respiratory:  Positive for cough.    Gastrointestinal:  Positive for nausea and vomiting. Negative for diarrhea.   Musculoskeletal:  Positive for myalgias.   All other systems reviewed and are negative.        Current Medications       Current Outpatient Medications:     ondansetron (ZOFRAN) 4 mg tablet, Take 1 tablet (4 mg total) by mouth every 8 (eight) hours as needed for nausea or vomiting, Disp: 20 tablet, Rfl: 0    benzonatate (TESSALON) 200 MG capsule, Take 1 capsule (200 mg total) by mouth 3 (three) times a day as needed for cough (Patient not taking: Reported on 8/12/2024), Disp: 20 capsule, Rfl: 0    diphenhydrAMINE (BENADRYL) 25 mg tablet, Take 1 tablet (25 mg total) by mouth every 6 (six) hours as  needed for itching (Patient not taking: Reported on 5/16/2023), Disp: 30 tablet, Rfl: 0    famotidine (PEPCID) 10 mg tablet, Take 1 tablet (10 mg total) by mouth 2 (two) times a day for 5 days, Disp: 10 tablet, Rfl: 0    Current Allergies     Allergies as of 08/12/2024    (No Known Allergies)            The following portions of the patient's history were reviewed and updated as appropriate: allergies, current medications, past family history, past medical history, past social history, past surgical history and problem list.     History reviewed. No pertinent past medical history.    History reviewed. No pertinent surgical history.    History reviewed. No pertinent family history.      Medications have been verified.        Objective   /72   Pulse (!) 115   Temp (!) 100.9 °F (38.3 °C)   Resp 20   Wt 90.4 kg (199 lb 3.2 oz)   SpO2 97%   BMI 29.42 kg/m²        Physical Exam     Physical Exam  Vitals and nursing note reviewed.   Constitutional:       General: He is not in acute distress.     Appearance: He is normal weight. He is ill-appearing. He is not toxic-appearing.   HENT:      Right Ear: Tympanic membrane normal.      Left Ear: Tympanic membrane normal.      Nose: Congestion present.      Mouth/Throat:      Mouth: Mucous membranes are moist.   Cardiovascular:      Rate and Rhythm: Regular rhythm. Tachycardia present.      Pulses: Normal pulses.      Heart sounds: Normal heart sounds.   Pulmonary:      Effort: Pulmonary effort is normal.      Breath sounds: Normal breath sounds.   Abdominal:      General: There is no distension.      Palpations: Abdomen is soft. There is no mass.      Tenderness: There is no abdominal tenderness. There is no guarding or rebound.      Hernia: No hernia is present.   Skin:     General: Skin is warm.      Capillary Refill: Capillary refill takes less than 2 seconds.   Neurological:      Mental Status: He is alert.

## 2024-08-21 ENCOUNTER — OFFICE VISIT (OUTPATIENT)
Dept: URGENT CARE | Facility: CLINIC | Age: 29
End: 2024-08-21

## 2024-08-21 VITALS
TEMPERATURE: 98.2 F | SYSTOLIC BLOOD PRESSURE: 115 MMHG | DIASTOLIC BLOOD PRESSURE: 57 MMHG | HEART RATE: 109 BPM | OXYGEN SATURATION: 93 % | RESPIRATION RATE: 20 BRPM

## 2024-08-21 DIAGNOSIS — J18.9 PNEUMONIA OF LEFT LOWER LOBE DUE TO INFECTIOUS ORGANISM: Primary | ICD-10-CM

## 2024-08-21 PROCEDURE — G0382 LEV 3 HOSP TYPE B ED VISIT: HCPCS

## 2024-08-21 RX ORDER — LEVOFLOXACIN 750 MG/1
750 TABLET, FILM COATED ORAL DAILY
COMMUNITY
Start: 2024-08-20 | End: 2024-08-30

## 2024-08-21 NOTE — PATIENT INSTRUCTIONS
Do chest PT. Try to sleep on your side and move. Use the pillows to prop yourself up. Use inhaler. If you cannot time the inhaler appropriately please  the spacer chamber. If your shortness of breath worsens please proceed to the ED. Use the incentive spirometer at home.

## 2024-08-21 NOTE — PROGRESS NOTES
[facility]        NAME: Isaac Noe is a 28 y.o. male  : 1995    MRN: [id number]  DATE: 2024  TIME: 11:20 AM    Assessment and Plan   Pneumonia of left lower lobe due to infectious organism [J18.9]  1. Pneumonia of left lower lobe due to infectious organism  Spacer/Aero-Holding Chambers HORACE            Patient Instructions       Follow up with PCP in 3-5 days.  Proceed to  ER if symptoms worsen.    Chief Complaint     Chief Complaint   Patient presents with   • trouble falling asleep     On Levaquin for pneumonia.  Diagnosed 2 days ago.           History of Present Illness       Patient is 28 year old male who was recently diagnosed with pneumonia 3 days ago. Was admitted 2 days ago and was switched from augmentin to levaquin. Notes sob is improving. Is not needing the inhaler.       Review of Systems   Review of Systems      Current Medications       Current Outpatient Medications:   •  levofloxacin (LEVAQUIN) 750 mg tablet, Take 750 mg by mouth daily, Disp: , Rfl:   •  ondansetron (ZOFRAN) 4 mg tablet, Take 1 tablet (4 mg total) by mouth every 8 (eight) hours as needed for nausea or vomiting, Disp: 20 tablet, Rfl: 0  •  Spacer/Aero-Holding Chambers HORACE, Use if needed (inhaler use), Disp: 1 each, Rfl: 0  •  benzonatate (TESSALON) 200 MG capsule, Take 1 capsule (200 mg total) by mouth 3 (three) times a day as needed for cough (Patient not taking: Reported on 2024), Disp: 20 capsule, Rfl: 0  •  diphenhydrAMINE (BENADRYL) 25 mg tablet, Take 1 tablet (25 mg total) by mouth every 6 (six) hours as needed for itching (Patient not taking: Reported on 2023), Disp: 30 tablet, Rfl: 0  •  famotidine (PEPCID) 10 mg tablet, Take 1 tablet (10 mg total) by mouth 2 (two) times a day for 5 days, Disp: 10 tablet, Rfl: 0  No current facility-administered medications for this visit.    Current Allergies     Allergies as of 2024   • (No Known Allergies)            The following  portions of the patient's history were reviewed and updated as appropriate: allergies, current medications, past family history, past medical history, past social history, past surgical history and problem list.     Past Medical History:   Diagnosis Date   • Pneumonia        History reviewed. No pertinent surgical history.    History reviewed. No pertinent family history.      Medications have been verified.        Objective   /57   Pulse (!) 109   Temp 98.2 °F (36.8 °C)   Resp 20   SpO2 93%        Physical Exam     Physical Exam

## 2024-12-11 ENCOUNTER — OFFICE VISIT (OUTPATIENT)
Dept: URGENT CARE | Facility: CLINIC | Age: 29
End: 2024-12-11
Payer: COMMERCIAL

## 2024-12-11 VITALS
RESPIRATION RATE: 18 BRPM | HEIGHT: 69 IN | TEMPERATURE: 99 F | HEART RATE: 98 BPM | SYSTOLIC BLOOD PRESSURE: 108 MMHG | WEIGHT: 194.8 LBS | OXYGEN SATURATION: 99 % | DIASTOLIC BLOOD PRESSURE: 64 MMHG | BODY MASS INDEX: 28.85 KG/M2

## 2024-12-11 DIAGNOSIS — R11.0 NAUSEA: ICD-10-CM

## 2024-12-11 DIAGNOSIS — K04.7 DENTAL INFECTION: Primary | ICD-10-CM

## 2024-12-11 PROBLEM — Z87.09 HISTORY OF PNEUMOTHORAX: Status: ACTIVE | Noted: 2023-05-28

## 2024-12-11 PROBLEM — R06.00 DYSPNEA: Status: ACTIVE | Noted: 2024-08-19

## 2024-12-11 PROBLEM — E66.811 OBESITY, CLASS I, BMI 30.0-34.9 (SEE ACTUAL BMI): Status: ACTIVE | Noted: 2021-09-01

## 2024-12-11 PROBLEM — J18.9 LEFT LOWER LOBE PNEUMONIA: Status: ACTIVE | Noted: 2024-08-19

## 2024-12-11 PROBLEM — A41.9 SEPSIS (HCC): Status: ACTIVE | Noted: 2024-12-11

## 2024-12-11 PROBLEM — K52.9 COLITIS: Status: ACTIVE | Noted: 2024-12-11

## 2024-12-11 PROBLEM — E83.42 HYPOMAGNESEMIA: Status: ACTIVE | Noted: 2024-12-11

## 2024-12-11 PROBLEM — G47.9 INABILITY TO SLEEP: Status: ACTIVE | Noted: 2024-08-19

## 2024-12-11 PROBLEM — R09.02 HYPOXIA: Status: ACTIVE | Noted: 2024-08-19

## 2024-12-11 PROBLEM — E87.6 HYPOKALEMIA: Status: ACTIVE | Noted: 2024-12-11

## 2024-12-11 PROCEDURE — S9083 URGENT CARE CENTER GLOBAL: HCPCS

## 2024-12-11 PROCEDURE — G0383 LEV 4 HOSP TYPE B ED VISIT: HCPCS

## 2024-12-11 RX ORDER — ONDANSETRON 4 MG/1
4 TABLET, FILM COATED ORAL EVERY 8 HOURS PRN
Qty: 20 TABLET | Refills: 0 | Status: SHIPPED | OUTPATIENT
Start: 2024-12-11

## 2024-12-11 RX ORDER — PENICILLIN V POTASSIUM 500 MG/1
500 TABLET, FILM COATED ORAL EVERY 6 HOURS SCHEDULED
Qty: 20 TABLET | Refills: 0 | Status: SHIPPED | OUTPATIENT
Start: 2024-12-11 | End: 2024-12-16

## 2024-12-11 RX ORDER — IBUPROFEN 600 MG/1
600 TABLET, FILM COATED ORAL EVERY 6 HOURS PRN
Qty: 30 TABLET | Refills: 0 | Status: SHIPPED | OUTPATIENT
Start: 2024-12-11

## 2024-12-11 NOTE — PROGRESS NOTES
[facility]        NAME: Isaac Noe is a 29 y.o. male  : 1995    MRN: [id number]  DATE: 2024  TIME: 11:32 AM    Assessment and Plan   Dental infection [K04.7]  1. Dental infection  penicillin V potassium (VEETID) 500 mg tablet    ibuprofen (MOTRIN) 600 mg tablet      2. Nausea  ondansetron (ZOFRAN) 4 mg tablet            Patient Instructions       Follow up with PCP in 3-5 days.  Proceed to  ER if symptoms worsen.    If tests are performed, our office will contact you with results only if changes need to made to the care plan discussed with you at the visit. You can review your full results on [facility].    Chief Complaint     Chief Complaint   Patient presents with   • Dental Pain                History of Present Illness       Patient here with dental pain. Seen at Urgent Care about 1week ago and was started on Augmentin. Patient reports GI upset so hasn't been taking it as often as he should. He also reprots that he has been taking Excedrin 1-2 tablets Q4-6hr for the pain. Has an appt at dentist tomorrow. Will switch to PCN VK with hopes that this will not be as assaulting on his GI tract as the Augmentin.         Review of Systems   Review of Systems   Constitutional:  Negative for chills, fatigue and fever.   HENT:  Positive for dental problem.    Gastrointestinal:  Positive for nausea. Negative for abdominal pain, constipation, diarrhea and vomiting.   Skin:  Negative for color change and rash.   Neurological:  Negative for dizziness, light-headedness and headaches.         Current Medications       Current Outpatient Medications:   •  ibuprofen (MOTRIN) 600 mg tablet, Take 1 tablet (600 mg total) by mouth every 6 (six) hours as needed for mild pain or moderate pain, Disp: 30 tablet, Rfl: 0  •  ondansetron (ZOFRAN) 4 mg tablet, Take 1 tablet (4 mg total) by mouth every 8 (eight) hours as needed for nausea or vomiting, Disp: 20 tablet, Rfl: 0  •  penicillin V  "potassium (VEETID) 500 mg tablet, Take 1 tablet (500 mg total) by mouth every 6 (six) hours for 5 days, Disp: 20 tablet, Rfl: 0  •  famotidine (PEPCID) 10 mg tablet, Take 1 tablet (10 mg total) by mouth 2 (two) times a day for 5 days, Disp: 10 tablet, Rfl: 0  •  Spacer/Aero-Holding Chambers HORACE, Use if needed (inhaler use), Disp: 1 each, Rfl: 0    Current Allergies     Allergies as of 12/11/2024   • (No Known Allergies)            The following portions of the patient's history were reviewed and updated as appropriate: allergies, current medications, past family history, past medical history, past social history, past surgical history and problem list.     Past Medical History:   Diagnosis Date   • Pneumonia        History reviewed. No pertinent surgical history.    History reviewed. No pertinent family history.      Medications have been verified.        Objective   /64   Pulse 98   Temp 99 °F (37.2 °C) (Tympanic)   Resp 18   Ht 5' 9\" (1.753 m)   Wt 88.4 kg (194 lb 12.8 oz)   SpO2 99%   BMI 28.77 kg/m²        Physical Exam     Physical Exam  Vitals and nursing note reviewed.   Constitutional:       General: He is awake.      Appearance: Normal appearance. He is well-developed and normal weight.   HENT:      Head: Normocephalic and atraumatic.      Mouth/Throat:      Lips: Pink.      Mouth: Mucous membranes are moist.     Cardiovascular:      Rate and Rhythm: Normal rate and regular rhythm.      Pulses: Normal pulses.      Heart sounds: Normal heart sounds.   Pulmonary:      Effort: Pulmonary effort is normal.      Breath sounds: Normal breath sounds.   Skin:     General: Skin is warm and dry.      Capillary Refill: Capillary refill takes less than 2 seconds.      Findings: No rash.   Neurological:      General: No focal deficit present.      Mental Status: He is alert. Mental status is at baseline.   Psychiatric:         Mood and Affect: Mood normal.         Behavior: Behavior is cooperative. "

## 2024-12-11 NOTE — PATIENT INSTRUCTIONS
STOP TAKING THE AUGMENTIN AND START THE PENICILLIN AS PRESCRIBED.     You may take over the counter Tylenol (Acetaminophen) as needed, as directed on packaging.     Please follow up with your primary provider in the next several days. Should you have any worsening of symptoms, or lack of improvement please be re-evaluated. If needed for significant concerns, consider 911 or ER evaluation.

## 2024-12-20 ENCOUNTER — OFFICE VISIT (OUTPATIENT)
Dept: URGENT CARE | Facility: CLINIC | Age: 29
End: 2024-12-20
Payer: COMMERCIAL

## 2024-12-20 VITALS
HEIGHT: 69 IN | WEIGHT: 199.6 LBS | HEART RATE: 70 BPM | OXYGEN SATURATION: 98 % | SYSTOLIC BLOOD PRESSURE: 112 MMHG | BODY MASS INDEX: 29.56 KG/M2 | TEMPERATURE: 97.8 F | DIASTOLIC BLOOD PRESSURE: 66 MMHG | RESPIRATION RATE: 18 BRPM

## 2024-12-20 DIAGNOSIS — K08.89 TOOTH PAIN: Primary | ICD-10-CM

## 2024-12-20 PROCEDURE — G0382 LEV 3 HOSP TYPE B ED VISIT: HCPCS | Performed by: PHYSICAL MEDICINE & REHABILITATION

## 2024-12-20 PROCEDURE — S9083 URGENT CARE CENTER GLOBAL: HCPCS | Performed by: PHYSICAL MEDICINE & REHABILITATION

## 2024-12-20 RX ORDER — PENICILLIN V POTASSIUM 500 MG/1
500 TABLET, FILM COATED ORAL EVERY 8 HOURS SCHEDULED
Qty: 21 TABLET | Refills: 1 | Status: SHIPPED | OUTPATIENT
Start: 2024-12-20 | End: 2024-12-27

## 2024-12-20 NOTE — PROGRESS NOTES
[facility]        NAME: Isaac Noe is a 29 y.o. male  : 1995    MRN: [id number]  DATE: 2024  TIME: 1:26 PM    Assessment and Plan   Tooth pain [K08.89]  1. Tooth pain  penicillin V potassium (VEETID) 500 mg tablet            Patient Instructions       Follow up with PCP in 3-5 days.  Proceed to  ER if symptoms worsen.    If tests are performed, our office will contact you with results only if changes need to made to the care plan discussed with you at the visit. [facility]    Chief Complaint     Chief Complaint   Patient presents with    Dental Pain     Was here 2 weeks ago , has a tooth abscess in molar , ran out of antibiotics that was received around 2 weeks ago , has an appointment on the  of this month , pain started around 3 weeks ago          History of Present Illness       Pt is a 29 year old male presenting with right lower dental pain, cracked tooth. He has been dealing with this for approximately 1 month. He was prescribed augmentin which did not help with the pain but caused an upset stomach for he received zofran for. Then he was placed on Penicillin which did help with the pain. He was seen by a few dentist and now has a scheduled appointment for tooth extraction on 24.     Dental Pain         Review of Systems   Review of Systems   Constitutional: Negative.    HENT:  Positive for dental problem.    Respiratory: Negative.     Cardiovascular: Negative.          Current Medications       Current Outpatient Medications:     ibuprofen (MOTRIN) 600 mg tablet, Take 1 tablet (600 mg total) by mouth every 6 (six) hours as needed for mild pain or moderate pain, Disp: 30 tablet, Rfl: 0    penicillin V potassium (VEETID) 500 mg tablet, Take 1 tablet (500 mg total) by mouth every 8 (eight) hours for 7 days, Disp: 21 tablet, Rfl: 1    famotidine (PEPCID) 10 mg tablet, Take 1 tablet (10 mg total) by mouth 2 (two) times a  "day for 5 days, Disp: 10 tablet, Rfl: 0    ondansetron (ZOFRAN) 4 mg tablet, Take 1 tablet (4 mg total) by mouth every 8 (eight) hours as needed for nausea or vomiting (Patient not taking: Reported on 12/20/2024), Disp: 20 tablet, Rfl: 0    Spacer/Aero-Holding Chambers HORACE, Use if needed (inhaler use) (Patient not taking: Reported on 12/20/2024), Disp: 1 each, Rfl: 0    Current Allergies     Allergies as of 12/20/2024    (No Known Allergies)            The following portions of the patient's history were reviewed and updated as appropriate: allergies, current medications, past family history, past medical history, past social history, past surgical history and problem list.     Past Medical History:   Diagnosis Date    Pneumonia        Past Surgical History:   Procedure Laterality Date    TONSILLECTOMY         History reviewed. No pertinent family history.      Medications have been verified.        Objective   /66   Pulse 70   Temp 97.8 °F (36.6 °C)   Resp 18   Ht 5' 9\" (1.753 m)   Wt 90.5 kg (199 lb 9.6 oz)   SpO2 98%   BMI 29.48 kg/m²        Physical Exam     Physical Exam  Vitals reviewed.   HENT:      Mouth/Throat:      Mouth: Mucous membranes are moist.      Dentition: Abnormal dentition. Dental tenderness and gingival swelling present.      Pharynx: Oropharynx is clear.   Cardiovascular:      Rate and Rhythm: Normal rate and regular rhythm.      Pulses: Normal pulses.      Heart sounds: Normal heart sounds.   Pulmonary:      Effort: Pulmonary effort is normal.      Breath sounds: Normal breath sounds.                   "